# Patient Record
Sex: MALE
[De-identification: names, ages, dates, MRNs, and addresses within clinical notes are randomized per-mention and may not be internally consistent; named-entity substitution may affect disease eponyms.]

---

## 2018-04-21 ENCOUNTER — HOSPITAL ENCOUNTER (EMERGENCY)
Dept: HOSPITAL 91 - FTE | Age: 18
Discharge: HOME | End: 2018-04-21
Payer: COMMERCIAL

## 2018-04-21 ENCOUNTER — HOSPITAL ENCOUNTER (EMERGENCY)
Age: 18
Discharge: HOME | End: 2018-04-21

## 2018-04-21 DIAGNOSIS — K29.70: Primary | ICD-10-CM

## 2018-04-21 LAB
ADD UMIC: YES
UR ASCORBIC ACID: NEGATIVE MG/DL
UR BILIRUBIN (DIP): NEGATIVE MG/DL
UR BLOOD (DIP): NEGATIVE MG/DL
UR CLARITY: CLEAR
UR COLOR: YELLOW
UR GLUCOSE (DIP): NEGATIVE MG/DL
UR KETONES (DIP): (no result) MG/DL
UR LEUKOCYTE ESTERASE (DIP): (no result) LEU/UL
UR MUCUS: (no result) /HPF
UR NITRITE (DIP): NEGATIVE MG/DL
UR PH (DIP): 5 (ref 5–9)
UR RBC: 0 /HPF (ref 0–5)
UR SPECIFIC GRAVITY (DIP): 1.02 (ref 1–1.03)
UR TOTAL PROTEIN (DIP): NEGATIVE MG/DL
UR UROBILINOGEN (DIP): NEGATIVE MG/DL
UR WBC: 2 /HPF (ref 0–5)

## 2018-04-21 PROCEDURE — 99284 EMERGENCY DEPT VISIT MOD MDM: CPT

## 2018-04-21 PROCEDURE — 81001 URINALYSIS AUTO W/SCOPE: CPT

## 2018-04-21 PROCEDURE — 74018 RADEX ABDOMEN 1 VIEW: CPT

## 2018-04-21 RX ADMIN — ALUMINUM HYDROXIDE, MAGNESIUM HYDROXIDE, DIMETHICONE 1 ML: 200; 200; 20 SUSPENSION ORAL at 22:09

## 2018-04-21 RX ADMIN — ONDANSETRON 1 MG: 4 TABLET, ORALLY DISINTEGRATING ORAL at 22:09

## 2019-03-14 ENCOUNTER — HOSPITAL ENCOUNTER (EMERGENCY)
Dept: HOSPITAL 91 - FTE | Age: 19
Discharge: HOME | End: 2019-03-14
Payer: COMMERCIAL

## 2019-03-14 ENCOUNTER — HOSPITAL ENCOUNTER (EMERGENCY)
Dept: HOSPITAL 10 - FTE | Age: 19
Discharge: HOME | End: 2019-03-14
Payer: COMMERCIAL

## 2019-03-14 VITALS
WEIGHT: 120.37 LBS | HEIGHT: 68 IN | BODY MASS INDEX: 18.24 KG/M2 | HEIGHT: 68 IN | BODY MASS INDEX: 18.24 KG/M2 | WEIGHT: 120.37 LBS

## 2019-03-14 VITALS — RESPIRATION RATE: 18 BRPM | HEART RATE: 94 BPM

## 2019-03-14 VITALS — DIASTOLIC BLOOD PRESSURE: 61 MMHG | SYSTOLIC BLOOD PRESSURE: 119 MMHG

## 2019-03-14 DIAGNOSIS — R14.0: Primary | ICD-10-CM

## 2019-03-14 PROCEDURE — 99282 EMERGENCY DEPT VISIT SF MDM: CPT

## 2019-03-14 RX ADMIN — ALUMINUM HYDROXIDE, MAGNESIUM HYDROXIDE, DIMETHICONE 1 ML: 200; 200; 20 SUSPENSION ORAL at 15:32

## 2019-03-14 NOTE — ERD
ER Documentation


Chief Complaint


Chief Complaint





abdominal pain x 2 days





ROS


All systems reviewed and are negative except as per history of present illness.





Medications


Home Meds


Active Scripts


Magaldrate/Simethicone* (Mylanta*) 355 Ml Susp, 30 ML PO QID PRN for 


GASTROINTESTINAL UPSET, #1 BOTTLE


   Prov:ROSA ARNDT DO         3/14/19


Polyethylene Glycol* (Miralax*) 17 Gm Powd.pack, 17 GM PO DAILY PRN for 


CONSTIPATION, #7


   Prov:WELLINGTONILAJOHN MORATAYA         4/21/18


Ondansetron Hcl* (Zofran*) 4 Mg Tablet, 4 MG PO Q8H PRN for NAUSEA AND/OR 


VOMITING, #30 TAB


   Prov:PASILAJOHN MORATAYA         4/21/18


Simethicone (GAS RELIEF) 80 Mg Tab.chew, 80 MG PO Q6 PRN for abdominal/gas pain,


#20 TAB.CHEW


   Prov:JOHN TREVIÑO         4/21/18


Famotidine* (Pepcid*) 20 Mg Tablet, 20 MG PO DAILY for 30 Days, #30 TAB


   Prov:WELLINGTONILAJOHN MORATAYA         4/21/18


Acetaminophen* (Tylophen*) 500 Mg Capsule, 1 CAP PO Q6H PRN for PAIN AND OR 


ELEVATED TEMP, #20 CAP


   Prov:WELLINGTONILABANJOHN         4/21/18





Allergies


Allergies:  


Coded Allergies:  


     No Known Allergy (Unverified , 3/14/19)





PMhx/Soc


History of Surgery:  Yes (lung surgery ( collapsed lungs))


Hx Miscellaneous Medical Probl:  Yes (constipation; gastritis)


Hx Alcohol Use:  No


Hx Substance Use:  No


Hx Tobacco Use:  No


Smoking Status:  Never smoker





Physical Exam


Vitals





Vital Signs


  Date      Temp   Pulse  Resp  B/P (MAP)   Pulse Ox  O2         O2 Flow    FiO2


Time                                                  Delivery   Rate


   3/14/19  100.0    100    18      119/61        98


     13:59                            (80)





Physical Exam


Const:   No acute distress


Head:   Atraumatic 


Eyes:    Normal Conjunctiva


ENT:    Normal External Ears, Nose and Mouth.


Neck:               Full range of motion. No meningismus.


Resp:   Clear to auscultation bilaterally


Cardio:   Regular rate and rhythm, no murmurs


Abd:    Soft, non tender, non distended. Normal bowel sounds


Skin:   No petechiae or rashes


Back:   No midline or flank tenderness


Ext:    No cyanosis, or edema


Neur:   Awake and alert


Psych:    Normal Mood and Affect


Results 24 hrs





Current Medications


 Medications
   Dose
          Sig/Nikolas
       Start Time
   Status  Last


 (Trade)       Ordered        Route
 PRN     Stop Time              Admin
Dose


                              Reason                                Admin


                40 ml          ONCE  ONCE
    3/14/19       DC           3/14/19


Miscellaneous                 PO
            15:30
                       15:32




 Medication
                                3/14/19 15:31


 (Gi Cocktail


(2))








Departure


Diagnosis:  


   Primary Impression:  


   Abdominal bloating


Condition:  Fair


Patient Instructions:  Abdominal Pain, Simethicone Chewable tablet


Referrals:  


[facility]


YOU HAVE RECEIVED A MEDICAL SCREENING EXAM AND THE RESULTS INDICATE THAT YOU DO 


NOT HAVE A CONDITION THAT REQUIRES URGENT TREATMENT IN THE EMERGENCY DEPARTMENT.





FURTHER EVALUATION AND TREATMENT OF YOUR CONDITION CAN WAIT UNTIL YOU ARE SEEN 


IN YOUR DOCTORS OFFICE WITHIN THE NEXT 1-2 DAYS. IT IS YOUR RESPONSIBILITY TO 


MAKE AN APPOINTMENT FOR FOLOW-UP CARE.





IF YOU HAVE A PRIMARY DOCTOR


--you should call your primary doctor and schedule an appointment





IF YOU DO NOT HAVE A PRIMARY DOCTOR YOU CAN CALL OUR PHYSICIAN REFERRAL HOTLINE 


AT


 [phone] 





IF YOU CAN NOT AFFORD TO SEE A PHYSICIAN YOU CAN CHOSE FROM THE FOLLOWING 


[facility][1] [phone] [address] [facility]. [facility] [phone] [address] [facility]. [facility] [phone]


[address] [facility] [phone] [address] [facility]. [facility] [phone] [address] [facility]. [facility]. [phone]


[address]





Additional Instructions:  


Call your primary care doctor TOMORROW for an appointment during the next 1-2 


days.See the doctor sooner or return here if your condition worsens before your 


appointment time.











ROSA ARNDT DO                 Mar 14, 2019 16:37

## 2019-06-19 ENCOUNTER — HOSPITAL ENCOUNTER (EMERGENCY)
Dept: HOSPITAL 10 - FTE | Age: 19
LOS: 1 days | Discharge: HOME | End: 2019-06-20
Payer: COMMERCIAL

## 2019-06-19 ENCOUNTER — HOSPITAL ENCOUNTER (EMERGENCY)
Dept: HOSPITAL 91 - FTE | Age: 19
LOS: 1 days | Discharge: HOME | End: 2019-06-20
Payer: COMMERCIAL

## 2019-06-19 VITALS
HEIGHT: 64 IN | BODY MASS INDEX: 20.32 KG/M2 | WEIGHT: 119.05 LBS | WEIGHT: 119.05 LBS | HEIGHT: 64 IN | BODY MASS INDEX: 20.32 KG/M2

## 2019-06-19 DIAGNOSIS — K29.40: Primary | ICD-10-CM

## 2019-06-19 DIAGNOSIS — F41.9: ICD-10-CM

## 2019-06-19 PROCEDURE — 36415 COLL VENOUS BLD VENIPUNCTURE: CPT

## 2019-06-19 PROCEDURE — 83690 ASSAY OF LIPASE: CPT

## 2019-06-19 PROCEDURE — 80053 COMPREHEN METABOLIC PANEL: CPT

## 2019-06-19 PROCEDURE — 96375 TX/PRO/DX INJ NEW DRUG ADDON: CPT

## 2019-06-19 PROCEDURE — 96374 THER/PROPH/DIAG INJ IV PUSH: CPT

## 2019-06-19 PROCEDURE — 99284 EMERGENCY DEPT VISIT MOD MDM: CPT

## 2019-06-19 PROCEDURE — 85025 COMPLETE CBC W/AUTO DIFF WBC: CPT

## 2019-06-20 VITALS — RESPIRATION RATE: 18 BRPM | SYSTOLIC BLOOD PRESSURE: 113 MMHG | DIASTOLIC BLOOD PRESSURE: 65 MMHG | HEART RATE: 97 BPM

## 2019-06-20 LAB
ADD MAN DIFF?: NO
ALANINE AMINOTRANSFERASE: 30 IU/L (ref 13–69)
ALBUMIN/GLOBULIN RATIO: 1.47
ALBUMIN: 5.3 G/DL (ref 3.3–4.9)
ALKALINE PHOSPHATASE: 66 IU/L (ref 42–121)
ANION GAP: 19 (ref 5–13)
ASPARTATE AMINO TRANSFERASE: 27 IU/L (ref 15–46)
BASOPHIL #: 0 10^3/UL (ref 0–0.1)
BASOPHILS %: 0.4 % (ref 0–2)
BILIRUBIN,DIRECT: 0 MG/DL (ref 0–0.2)
BILIRUBIN,TOTAL: 2.2 MG/DL (ref 0.2–1.3)
BLOOD UREA NITROGEN: 16 MG/DL (ref 7–20)
CALCIUM: 10.5 MG/DL (ref 8.4–10.2)
CARBON DIOXIDE: 23 MMOL/L (ref 21–31)
CHLORIDE: 105 MMOL/L (ref 97–110)
CREATININE: 0.79 MG/DL (ref 0.61–1.24)
EOSINOPHILS #: 0.1 10^3/UL (ref 0–0.5)
EOSINOPHILS %: 1 % (ref 0–7)
GLOBULIN: 3.6 G/DL (ref 1.3–3.2)
GLUCOSE: 96 MG/DL (ref 70–220)
HEMATOCRIT: 44.8 % (ref 42–52)
HEMOGLOBIN: 15.4 G/DL (ref 14–18)
IMMATURE GRANS #M: 0.01 10^3/UL (ref 0–0.03)
IMMATURE GRANS % (M): 0.1 % (ref 0–0.43)
LIPASE: 136 U/L (ref 23–300)
LYMPHOCYTES #: 2.2 10^3/UL (ref 0.8–2.9)
LYMPHOCYTES %: 28.7 % (ref 15–51)
MEAN CORPUSCULAR HEMOGLOBIN: 29.6 PG (ref 29–33)
MEAN CORPUSCULAR HGB CONC: 34.4 G/DL (ref 32–37)
MEAN CORPUSCULAR VOLUME: 86 FL (ref 82–101)
MEAN PLATELET VOLUME: 9.7 FL (ref 7.4–10.4)
MONOCYTE #: 0.5 10^3/UL (ref 0.3–0.9)
MONOCYTES %: 5.9 % (ref 0–11)
NEUTROPHIL #: 4.9 10^3/UL (ref 1.6–7.5)
NEUTROPHILS %: 63.9 % (ref 39–77)
NUCLEATED RED BLOOD CELLS #: 0 10^3/UL (ref 0–0)
NUCLEATED RED BLOOD CELLS%: 0 /100WBC (ref 0–0)
PLATELET COUNT: 256 10^3/UL (ref 140–415)
POTASSIUM: 3.9 MMOL/L (ref 3.5–5.1)
RED BLOOD COUNT: 5.21 10^6/UL (ref 4.7–6.1)
RED CELL DISTRIBUTION WIDTH: 11.6 % (ref 11.5–14.5)
SODIUM: 147 MMOL/L (ref 135–144)
TOTAL PROTEIN: 8.9 G/DL (ref 6.1–8.1)
WHITE BLOOD COUNT: 7.6 10^3/UL (ref 4.8–10.8)

## 2019-06-20 RX ADMIN — ONDANSETRON HYDROCHLORIDE 1 MG: 2 INJECTION, SOLUTION INTRAMUSCULAR; INTRAVENOUS at 04:08

## 2019-06-20 RX ADMIN — LORAZEPAM 1 MG: 2 INJECTION, SOLUTION INTRAMUSCULAR; INTRAVENOUS at 04:21

## 2019-06-20 RX ADMIN — ALUMINUM HYDROXIDE, MAGNESIUM HYDROXIDE, DIMETHICONE 1 ML: 200; 200; 20 SUSPENSION ORAL at 04:09

## 2019-06-20 RX ADMIN — THIAMINE HYDROCHLORIDE 1 MLS/HR: 100 INJECTION, SOLUTION INTRAMUSCULAR; INTRAVENOUS at 04:11

## 2019-06-20 RX ADMIN — FAMOTIDINE 1 MG: 10 INJECTION, SOLUTION INTRAVENOUS at 04:08

## 2019-06-20 NOTE — ERD
ER Documentation


Chief Complaint


Chief Complaint





epigastric pain x 3 days





HPI


This is a 19-year-old male with history of anxiety and gastritis presents to the


ED complaining of progressively worsening epigastric pain x3 days.  Patient 


describes this as a burning and gnawing pain.  No known precipitating factors.  


Pain is worse when eating.  He also reports feeling bloated and having some 


nausea.  He states he has Pepcid at home but has not been taking it.  He states 


he is a relatively healthy diet.  No associated fevers or chills.  No diarrhea. 


Patient states he has been under a lot of stress recently.  He also reports 


feeling anxious and requesting something for this.





ROS


All systems reviewed and are negative except as per history of present illness.





Medications


Home Meds


Active Scripts


Famotidine* (Pepcid*) 20 Mg Tablet, 20 MG PO BID, #20 TAB


   Prov:DISHIGRIKIAN,ZEPYUR N PA-C         19


Hydroxyzine Hcl* (Hydroxyzine Hcl*) 25 Mg Tablet, 25 MG PO at bedtime PRN for 


ANXIETY, #15 TAB


   Prov:DISHIGRIKIANSHEILA N PA-C         19


Magaldrate/Simethicone* (Mylanta*) 355 Ml Susp, 30 ML PO QID PRN for 


GASTROINTESTINAL UPSET, #1 BOTTLE


   Prov:HAIRROSA          3/14/19


Polyethylene Glycol* (Miralax*) 17 Gm Powd.pack, 17 GM PO DAILY PRN for 


CONSTIPATION, #7


   Prov:PASILAJOHN MORATAYA F         18


Ondansetron Hcl* (Zofran*) 4 Mg Tablet, 4 MG PO Q8H PRN for NAUSEA AND/OR 


VOMITING, #30 TAB


   Prov:PASILABANJOHN F         18


Simethicone (GAS RELIEF) 80 Mg Tab.chew, 80 MG PO Q6 PRN for abdominal/gas pain,


#20 TAB.CHEW


   Prov:PASILAJOHN MORATAYA F         18


Famotidine* (Pepcid*) 20 Mg Tablet, 20 MG PO DAILY for 30 Days, #30 TAB


   Prov:PASILACRISTOPHER MORATAYAAR F         18


Acetaminophen* (Tylophen*) 500 Mg Capsule, 1 CAP PO Q6H PRN for PAIN AND OR YONI


VATED TEMP, #20 CAP


   Prov:PASILABANCRISTOPHERAR F         18





Allergies


Allergies:  


Coded Allergies:  


     No Known Allergy (Unverified , 3/14/19)





PMhx/Soc


History of Surgery:  Yes (lung surgery ( collapsed lungs))


Hx Miscellaneous Medical Probl:  Yes (constipation; gastritis)


Hx Alcohol Use:  No


Hx Substance Use:  No


Hx Tobacco Use:  No


Smoking Status:  Never smoker





Physical Exam


Vitals





Vital Signs


  Date      Temp  Pulse  Resp  B/P (MAP)   Pulse Ox  O2          O2 Flow    FiO2


Time                                                 Delivery    Rate


   19  97.3     98    20      115/68       100


     23:50                           (84)





Physical Exam


Const:   No acute distress


Head:   Atraumatic 


Eyes:    Normal Conjunctiva


ENT:    Normal External Ears, Nose and Mouth.


Neck:               Full range of motion. No meningismus.


Resp:   Clear to auscultation bilaterally


Cardio:   Regular rate and rhythm, no murmurs


Abd:    Soft, + mid epigastric tenderness to palpation.  No rebound, no 


guarding.  Negative McBurney's.  Negative Whitfield's., non distended. Normal bowel


sounds


Skin:   No petechiae or rashes


Back:   No midline or flank tenderness


Ext:    No cyanosis, or edema


Neur:   Awake and alert


Psych:    + Very anxious appearing.


Result Diagram:  


19 0358                                                                    


           19 0358





Results 24 hrs





Laboratory Tests


              Test
                                 19
03:58


              White Blood Count                      7.6 10^3/ul


              Red Blood Count                       5.21 10^6/ul


              Hemoglobin                               15.4 g/dl


              Hematocrit                                  44.8 %


              Mean Corpuscular Volume                    86.0 fl


              Mean Corpuscular Hemoglobin                29.6 pg


              Mean Corpuscular Hemoglobin
Concent     34.4 g/dl 



              Red Cell Distribution Width                 11.6 %


              Platelet Count                         256 10^3/UL


              Mean Platelet Volume                        9.7 fl


              Immature Granulocytes %                    0.100 %


              Neutrophils %                               63.9 %


              Lymphocytes %                               28.7 %


              Monocytes %                                  5.9 %


              Eosinophils %                                1.0 %


              Basophils %                                  0.4 %


              Nucleated Red Blood Cells %            0.0 /100WBC


              Immature Granulocytes #              0.010 10^3/ul


              Neutrophils #                          4.9 10^3/ul


              Lymphocytes #                          2.2 10^3/ul


              Monocytes #                            0.5 10^3/ul


              Eosinophils #                          0.1 10^3/ul


              Basophils #                            0.0 10^3/ul


              Nucleated Red Blood Cells #            0.0 10^3/ul


              Sodium Level                            147 mmol/L


              Potassium Level                         3.9 mmol/L


              Chloride Level                          105 mmol/L


              Carbon Dioxide Level                     23 mmol/L


              Anion Gap                                       19


              Blood Urea Nitrogen                       16 mg/dl


              Creatinine                              0.79 mg/dl


              Est Glomerular Filtrat Rate
mL/min   > 60 mL/min 



              Glucose Level                             96 mg/dl


              Calcium Level                           10.5 mg/dl


              Total Bilirubin                          2.2 mg/dl


              Direct Bilirubin                        0.00 mg/dl


              Indirect Bilirubin                       2.2 mg/dl


              Aspartate Amino Transf
(AST/SGOT)         27 IU/L 



              Alanine Aminotransferase
(ALT/SGPT)       30 IU/L 



              Alkaline Phosphatase                       66 IU/L


              Total Protein                             8.9 g/dl


              Albumin                                   5.3 g/dl


              Globulin                                 3.60 g/dl


              Albumin/Globulin Ratio                        1.47


              Lipase                                     136 U/L





Current Medications


 Medications
   Dose
          Sig/Nikolas
       Start Time
   Status  Last


 (Trade)       Ordered        Route
 PRN     Stop Time              Admin
Dose


                              Reason                                Admin


 Sodium         1,000 ml @ 
   Q1H STAT
      19       DC           19


Chloride       1,000 mls/hr   IV
            03:49
                       04:11



                                             19 04:48


 Ondansetron    4 mg           ONCE  STAT
    19       DC           19


HCl
  (Zofran                 IV
            03:49
                       04:08



Inj)                                         19 03:51


 Famotidine
    20 mg          ONCE  STAT
    19       DC           19


(Pepcid Iv)                   IV
            03:49
                       04:08



                                             19 03:51


                40 ml          ONCE  STAT
    19       DC           19


Miscellaneous                 PO
            03:49
                       04:09




 Medication
                                19 03:51


 (Gi Cocktail


(2))


 Lorazepam
     1 mg           ONCE  ONCE
    19       DC           19


(Ativan)                      IV
            04:30
                       04:21



                                             19 04:31








Procedures/MDM


LABS & DIAGNOSTIC IMAGING:


CBC:      no e/o of systemic infection or severe anemia


CMP:      no e/o severe acidosis, alkalosis, renal failure, diabetic 


ketoacidosis, liver disease


The patient's lipase is normal and indicative of no pancreatitis.








ED COURSE:


The patient was given IV fluids, Zofran, Pepcid, GI cocktail, Ativan


The medication was well tolerated and the patient had market improvement in 


symptoms. 


The patient remained stable throughout ED course.








MEDICAL DECISION MAKIN-year-old male with history of anxiety and gastritis presents with epigastric 


pain.  His lab work is unremarkable.  His abdominal exam is essentially benign. 


He has no fever here.  Vital signs are stable.  His symptoms improved after IV 


fluids, Pepcid and a GI cocktail.  Symptoms are likely a flareup of his 


gastritis.  I have low suspicion for pancreatitis, appendicitis, cholecystitis 


or any other emergent intra-abdominal process.  Patient also requested 


medications for anxiety.  I discussed with patient that stress and anxiety could


definitely flare up his gastritis.  Patient understands.  He was therefore given


a short prescription of hydroxyzine as well as his Pepcid to take as needed.  


Recommend follow-up with his primary care physician times week.  Strict return 


precautions were discussed.








PRESCRIPTIONS: Pepcid, hydroxyzine








SPECIALIST FOLLOW UP RECOMMENDED: None


Patient has been advised to follow up with primary care in 1-2 days.





Departure


Diagnosis:  


   Primary Impression:  


   Gastritis


   Gastritis type:  atrophic  Gastritis bleeding:  without bleeding  Qualified 


   Codes:  K29.40 - Chronic atrophic gastritis without bleeding


   Additional Impression:  


   Anxiety


Condition:  Stable


Patient Instructions:  Anxiety Reaction, Gastritis (Adult)


Referrals:  


[facility]


YOU HAVE RECEIVED A MEDICAL SCREENING EXAM AND THE RESULTS INDICATE THAT YOU DO 


NOT HAVE A CONDITION THAT REQUIRES URGENT TREATMENT IN THE EMERGENCY DEPARTMENT.





FURTHER EVALUATION AND TREATMENT OF YOUR CONDITION CAN WAIT UNTIL YOU ARE SEEN 


IN YOUR DOCTORS OFFICE WITHIN THE NEXT 1-2 DAYS. IT IS YOUR RESPONSIBILITY TO 


MAKE AN APPOINTMENT FOR FOLOW-UP CARE.





IF YOU HAVE A PRIMARY DOCTOR


--you should call your primary doctor and schedule an appointment





IF YOU DO NOT HAVE A PRIMARY DOCTOR YOU CAN CALL OUR PHYSICIAN REFERRAL HOTLINE 


AT


 [phone] 





IF YOU CAN NOT AFFORD TO SEE A PHYSICIAN YOU CAN CHOSE FROM THE FOLLOWING C


[facility] [phone] [address] [facility] [phone] [address] [facility] [[email or web address]] [phone] [address] [facility] [phone] [address] [facility] [phone] [address] [facility]. [facility]. [phone] [address] [facility]


YOU HAVE RECEIVED A MEDICAL SCREENING EXAM AND THE RESULTS INDICATE THAT YOU DO 


NOT HAVE A CONDITION THAT REQUIRES URGENT TREATMENT IN THE EMERGENCY DEPARTMENT.





FURTHER EVALUATION AND TREATMENT OF YOUR CONDITION CAN WAIT UNTIL YOU ARE SEEN 


IN YOUR DOCTORS OFFICE WITHIN THE NEXT 1-2 DAYS. IT IS YOUR RESPONSIBILITY TO 


MAKE AN APPOINTMENT FOR FOLOW-UP CARE.





IF YOU HAVE A PRIMARY DOCTOR


--you should call your primary doctor and schedule and appointment





IF YOU DO NOT HAVE A PRIMARY DOCTOR YOU CAN CALL OUR PHYSICIAN REFERRAL HOTLINE 


AT [phone].





IF YOU CAN NOT AFFORD TO SEE A PHYSICIAN YOU CAN CHOSE FROM THE FOLLOWING [facility]


INSTITUTIONS:





[facility]


[address] [place] [address]





[facility]


[address] [place] [address]





[facility]


[address] [place] [address]





Additional Instructions:  


Call your primary care doctor TOMORROW for an appointment during the next 2-4 


days and bring all the information and medications prescribed. 





If the symptoms get worse and your provider is unavailable, return to the 


Emergency Department immediately.











SHEILA CARRERA PA-C     2019 05:45